# Patient Record
Sex: MALE | Race: ASIAN | NOT HISPANIC OR LATINO | URBAN - METROPOLITAN AREA
[De-identification: names, ages, dates, MRNs, and addresses within clinical notes are randomized per-mention and may not be internally consistent; named-entity substitution may affect disease eponyms.]

---

## 2018-09-04 ENCOUNTER — EMERGENCY (EMERGENCY)
Facility: HOSPITAL | Age: 58
LOS: 1 days | Discharge: HOME | End: 2018-09-04
Attending: EMERGENCY MEDICINE
Payer: COMMERCIAL

## 2018-09-04 VITALS
DIASTOLIC BLOOD PRESSURE: 101 MMHG | TEMPERATURE: 98 F | WEIGHT: 195.11 LBS | HEART RATE: 88 BPM | RESPIRATION RATE: 16 BRPM | SYSTOLIC BLOOD PRESSURE: 144 MMHG | HEIGHT: 69 IN | OXYGEN SATURATION: 98 %

## 2018-09-04 PROCEDURE — 99283 EMERGENCY DEPT VISIT LOW MDM: CPT | Mod: 25

## 2018-09-04 PROCEDURE — 74018 RADEX ABDOMEN 1 VIEW: CPT

## 2018-09-04 PROCEDURE — 74018 RADEX ABDOMEN 1 VIEW: CPT | Mod: 26

## 2018-09-04 NOTE — ED ADULT NURSE NOTE - NSIMPLEMENTINTERV_GEN_ALL_ED
Implemented All Universal Safety Interventions:  [place] to call system. Call bell, personal items and telephone within reach. Instruct patient to call for assistance. Room bathroom lighting operational. Non-slip footwear when patient is off stretcher. Physically safe environment: no spills, clutter or unnecessary equipment. Stretcher in lowest position, wheels locked, appropriate side rails in place.

## 2018-09-04 NOTE — ED PROVIDER NOTE - ENMT, MLM
Airway patent, Nasal mucosa clear. Mouth with normal mucosa. Throat has no vesicles, no oropharyngeal exudates and uvula is midline, missing crown 6 & 7.

## 2018-09-04 NOTE — ED PROVIDER NOTE - PROGRESS NOTE DETAILS
carson: spoke with Dr Starr and confirmed no need to retrieve crowns.    dx swallowed dental crown.  can have rpt xr in 3 days. left ambulatory.  return precautions given.

## 2018-09-04 NOTE — ED ADULT NURSE NOTE - OBJECTIVE STATEMENT
pt is A&Ox3, ambulatory, able to make needs known and presents with C/O swallowing bridge t dental work on right side of mouth. PT denies any SOB, inability to swallow, pain, or drooling. Came in for evaluation

## 2018-09-04 NOTE — ED PROVIDER NOTE - OBJECTIVE STATEMENT
57 y/o M with a PMHx of Gout and no significant PSHx presents to ED c/o of swallowing both of his permanent crowns (6 & 7). Pt denies any abd pain/discomfort, nausea, vomiting. Pt states that the crown has a bit of metal on it with no sharp edges. NKDA.

## 2018-09-04 NOTE — ED PROVIDER NOTE - MEDICAL DECISION MAKING DETAILS
57 y/o M presenting to ED with swallowed 2 crowns will obtain x-ray and consult with GI -stated will likely pass due to not sharp object. Encourage pt to return on precautions.

## 2021-09-27 NOTE — ED PROVIDER NOTE - NS_EDPROVIDERDISPOUSERTYPE_ED_A_ED
No Scribe Attestation (For Scribes USE Only)... Attending Attestation (For Attendings USE Only).../Scribe Attestation (For Scribes USE Only)...